# Patient Record
Sex: FEMALE | Race: BLACK OR AFRICAN AMERICAN | NOT HISPANIC OR LATINO | URBAN - METROPOLITAN AREA
[De-identification: names, ages, dates, MRNs, and addresses within clinical notes are randomized per-mention and may not be internally consistent; named-entity substitution may affect disease eponyms.]

---

## 2022-04-30 ENCOUNTER — TELEPHONE ENCOUNTER (OUTPATIENT)
Dept: URBAN - METROPOLITAN AREA CLINIC 121 | Facility: CLINIC | Age: 62
End: 2022-04-30

## 2022-04-30 RX ORDER — LIRAGLUTIDE 6 MG/ML
INJECTION SUBCUTANEOUS
OUTPATIENT
Start: 2013-01-09 | End: 2013-01-16

## 2022-04-30 RX ORDER — LIRAGLUTIDE 6 MG/ML
INJECTION SUBCUTANEOUS
OUTPATIENT
Start: 2013-01-09

## 2022-04-30 RX ORDER — ASPIRIN 81 MG/1
TABLET ORAL
OUTPATIENT
Start: 2013-01-09

## 2022-04-30 RX ORDER — LAMOTRIGINE 25 MG/1
TABLET ORAL
OUTPATIENT
Start: 2013-01-09

## 2022-05-01 ENCOUNTER — TELEPHONE ENCOUNTER (OUTPATIENT)
Dept: URBAN - METROPOLITAN AREA CLINIC 121 | Facility: CLINIC | Age: 62
End: 2022-05-01

## 2022-05-01 RX ORDER — ATORVASTATIN CALCIUM 40 MG/1
QD TABLET, FILM COATED ORAL
Status: ACTIVE | COMMUNITY
Start: 2013-01-16

## 2022-05-01 RX ORDER — ASPIRIN 81 MG/1
QD TABLET ORAL
Status: ACTIVE | COMMUNITY
Start: 2013-01-16

## 2022-05-01 RX ORDER — POLYETHYLENE GLYCOL 3350, SODIUM SULFATE, SODIUM CHLORIDE, POTASSIUM CHLORIDE, ASCORBIC ACID, SODIUM ASCORBATE 7.5-2.691G
MIX AND USE AS DIRECTED KIT ORAL
Status: ACTIVE | COMMUNITY
Start: 2013-01-09

## 2022-05-01 RX ORDER — INSULIN GLARGINE 100 [IU]/ML
25-30U QHS INJECTION, SOLUTION SUBCUTANEOUS
Status: ACTIVE | COMMUNITY
Start: 2013-01-16

## 2022-05-01 RX ORDER — METFORMIN HCL 1000 MG/1
BID TABLET ORAL
Status: ACTIVE | COMMUNITY
Start: 2013-01-16

## 2022-05-01 RX ORDER — METOPROLOL TARTRATE 50 MG/1
BID TABLET ORAL
Status: ACTIVE | COMMUNITY
Start: 2013-01-16

## 2022-06-26 ENCOUNTER — HOSPITAL ENCOUNTER (EMERGENCY)
Facility: HOSPITAL | Age: 62
Discharge: HOME | End: 2022-06-26
Attending: EMERGENCY MEDICINE
Payer: COMMERCIAL

## 2022-06-26 ENCOUNTER — APPOINTMENT (EMERGENCY)
Dept: RADIOLOGY | Facility: HOSPITAL | Age: 62
End: 2022-06-26
Payer: COMMERCIAL

## 2022-06-26 VITALS
SYSTOLIC BLOOD PRESSURE: 140 MMHG | BODY MASS INDEX: 39.71 KG/M2 | HEIGHT: 68 IN | HEART RATE: 86 BPM | OXYGEN SATURATION: 100 % | RESPIRATION RATE: 18 BRPM | WEIGHT: 262 LBS | TEMPERATURE: 98.9 F | DIASTOLIC BLOOD PRESSURE: 76 MMHG

## 2022-06-26 DIAGNOSIS — M25.462 KNEE EFFUSION, LEFT: Primary | ICD-10-CM

## 2022-06-26 DIAGNOSIS — M25.562 ACUTE PAIN OF LEFT KNEE: ICD-10-CM

## 2022-06-26 LAB
APPEARANCE SNV: ABNORMAL
BODY FLD TYPE: ABNORMAL
COLOR SNV: ABNORMAL
LYMPHOCYTES NFR FLD MANUAL: 1 %
MONOS+MACROS NFR FLD MANUAL: 15 %
NEUTROPHILS NFR FLD MANUAL: 84 %
RBC # SNV: ABNORMAL CELLS/CU MM (ref 0–10000)
SPECIMEN SOURCE: ABNORMAL
WBC # SNV AUTO: ABNORMAL CELLS/CU MM (ref 0–200)

## 2022-06-26 PROCEDURE — 89060 EXAM SYNOVIAL FLUID CRYSTALS: CPT | Performed by: PHYSICIAN ASSISTANT

## 2022-06-26 PROCEDURE — 63700000 HC SELF-ADMINISTRABLE DRUG: Performed by: PHYSICIAN ASSISTANT

## 2022-06-26 PROCEDURE — 20610 DRAIN/INJ JOINT/BURSA W/O US: CPT | Performed by: PHYSICIAN ASSISTANT

## 2022-06-26 PROCEDURE — 73564 X-RAY EXAM KNEE 4 OR MORE: CPT | Mod: LT

## 2022-06-26 PROCEDURE — 99283 EMERGENCY DEPT VISIT LOW MDM: CPT | Mod: 25

## 2022-06-26 PROCEDURE — 87070 CULTURE OTHR SPECIMN AEROBIC: CPT | Performed by: PHYSICIAN ASSISTANT

## 2022-06-26 PROCEDURE — 89051 BODY FLUID CELL COUNT: CPT | Performed by: PHYSICIAN ASSISTANT

## 2022-06-26 PROCEDURE — 87205 SMEAR GRAM STAIN: CPT | Performed by: PHYSICIAN ASSISTANT

## 2022-06-26 PROCEDURE — 0S9D3ZZ DRAINAGE OF LEFT KNEE JOINT, PERCUTANEOUS APPROACH: ICD-10-PCS | Performed by: EMERGENCY MEDICINE

## 2022-06-26 RX ORDER — NAPROXEN 250 MG/1
500 TABLET ORAL ONCE
Status: COMPLETED | OUTPATIENT
Start: 2022-06-26 | End: 2022-06-26

## 2022-06-26 RX ORDER — TRAMADOL HYDROCHLORIDE 50 MG/1
50 TABLET ORAL ONCE
Status: COMPLETED | OUTPATIENT
Start: 2022-06-26 | End: 2022-06-26

## 2022-06-26 RX ORDER — TRAMADOL HYDROCHLORIDE 50 MG/1
50 TABLET ORAL EVERY 6 HOURS PRN
Qty: 8 TABLET | Refills: 0 | Status: SHIPPED | OUTPATIENT
Start: 2022-06-26 | End: 2022-06-29

## 2022-06-26 RX ORDER — ACETAMINOPHEN AND CODEINE PHOSPHATE 300; 30 MG/1; MG/1
1 TABLET ORAL ONCE
Status: DISCONTINUED | OUTPATIENT
Start: 2022-06-26 | End: 2022-06-26

## 2022-06-26 RX ORDER — DICLOFENAC SODIUM 10 MG/G
GEL TOPICAL 2 TIMES DAILY PRN
Qty: 100 G | Refills: 0 | Status: SHIPPED | OUTPATIENT
Start: 2022-06-26 | End: 2022-07-01

## 2022-06-26 RX ADMIN — TRAMADOL HYDROCHLORIDE 50 MG: 50 TABLET, COATED ORAL at 14:57

## 2022-06-26 RX ADMIN — NAPROXEN 500 MG: 250 TABLET ORAL at 18:38

## 2022-06-26 ASSESSMENT — ENCOUNTER SYMPTOMS
CHILLS: 0
SHORTNESS OF BREATH: 0
ARTHRALGIAS: 1
ABDOMINAL PAIN: 0
HEADACHES: 0
NAUSEA: 0
LIGHT-HEADEDNESS: 0
NUMBNESS: 0
FEVER: 0
DIZZINESS: 0
COLOR CHANGE: 0
DIAPHORESIS: 0
WEAKNESS: 0
VOMITING: 0
JOINT SWELLING: 1

## 2022-06-26 NOTE — ED PROVIDER NOTES
Emergency Medicine Note  HPI   HISTORY OF PRESENT ILLNESS     61-year-old female with PMH of arthritis presents emergency department for left knee pain over the last 5 days.  Patient states that the pain has progressively worsened since onset.  Patient states pain constant worse with any movement.  Patient states that she was cleaning her house on Wednesday but denies any injuries.  Patient states she has had similar pain in the past with arthritis.  Patient states that within the last 1 to 2 days she has now had swelling to the knee that is worse than she has had before.  Patient states she also feels like her left knee is warm to touch which she has never had before.  Patient denies any numbness or tingling.  Patient denies any fevers, chills, or diaphoresis.  Patient has been taking Tylenol arthritis with minimal relief of symptoms.      History provided by:  Patient   used: No    Knee Pain  Associated symptoms: no fever          Patient History   PAST HISTORY     Reviewed from Nursing Triage:         No past medical history on file.    No past surgical history on file.    No family history on file.           Review of Systems   REVIEW OF SYSTEMS     Review of Systems   Constitutional: Negative for chills, diaphoresis and fever.   Respiratory: Negative for shortness of breath.    Cardiovascular: Negative for chest pain.   Gastrointestinal: Negative for abdominal pain, nausea and vomiting.   Musculoskeletal: Positive for arthralgias and joint swelling.   Skin: Negative for color change and rash.   Neurological: Negative for dizziness, weakness, light-headedness, numbness and headaches.         VITALS     ED Vitals    Date/Time Temp Pulse Resp BP SpO2 [facility]   06/26/22 1309 37.2 °C (98.9 °F) 94 16 144/74 99 % AW        Pulse Ox %: 99 % (06/26/22 1433)  Pulse Ox Interpretation: Normal (06/26/22 1433)  Heart Rate: 94 (06/26/22 1433)  Rhythm Strip Interpretation: Normal Sinus Rhythm (06/26/22 1433)      Physical Exam   PHYSICAL EXAM     Physical Exam  Vitals and nursing note reviewed.   Constitutional:       General: She is not in acute distress.     Appearance: Normal appearance. She is well-developed. She is not ill-appearing or diaphoretic.   HENT:      Head: Normocephalic and atraumatic.   Eyes:      Extraocular Movements: Extraocular movements intact.      Conjunctiva/sclera: Conjunctivae normal.   Cardiovascular:      Rate and Rhythm: Normal rate.      Pulses: Normal pulses.      Comments: 2+ dorsalis pedis pulses bilaterally.  Pulmonary:      Effort: Pulmonary effort is normal. No respiratory distress.   Musculoskeletal:         General: Swelling and tenderness present. Normal range of motion.      Cervical back: Normal range of motion.      Comments: Patient has decreased range of motion of the left knee due to pain.  No pain with micromotion to the left knee.  Patient does have significant swelling appreciated to left knee.  Patient tender palpation to the superior and lateral aspects of the anterior left knee.  No tenderness palpation of the posterior aspect.  No other tenderness palpation appreciated to left lower extremity.   Skin:     General: Skin is warm and dry.      Capillary Refill: Capillary refill takes less than 2 seconds.      Findings: No erythema.      Comments: Left knee is warm to touch.  No erythema appreciated to left knee.   Neurological:      Mental Status: She is alert.      Comments: Sensation intact to lower extremities bilaterally.   Psychiatric:         Mood and Affect: Mood normal.           PROCEDURES     Arthrocentesis    Date/Time: 6/26/2022 3:46 PM  Performed by: Ramila Bryan PA C  Authorized by: Beto Vasquez MD     Consent:     Consent obtained:  Verbal    Consent given by:  Patient    Risks, benefits, and alternatives were discussed: yes      Risks discussed:  Bleeding, incomplete drainage, infection, nerve damage, pain and poor cosmetic result  Universal  protocol:     Procedure explained and questions answered to patient or proxy's satisfaction: yes      Imaging studies available: yes      Patient identity confirmed:  Verbally with patient  Location:     Location:  Knee    Knee:  L knee  Anesthesia:     Anesthesia method:  Local infiltration    Local anesthetic:  Lidocaine 1% w/o epi  Procedure details:     Needle gauge:  22 G    Ultrasound guidance: no      Approach:  Superior    Aspirate amount:  30    Aspirate characteristics:  Yellow and cloudy    Steroid injected: no      Specimen collected: yes    Post-procedure details:     Dressing:  Adhesive bandage    Procedure completion:  Tolerated well, no immediate complications         DATA     Results     None          Imaging Results          X-RAY KNEE LEFT 4+ VIEWS (Final result)  Result time 06/26/22 14:57:36    Final result                 Impression:    IMPRESSION:  1. Large joint effusion.  2. No acute bony abnormality.  3. Osteoarthritis.             Narrative:    CLINICAL HISTORY: Pain.    COMMENT: 4 views of the left knee are performed. No acute fractures or  dislocations are seen. Large joint effusion is noted. Marginal osteophytes are  identified in all 3 compartments. Chondrocalcinosis is seen involving medial and  lateral compartments.                                No orders to display       Scoring tools                                 ED Course & MDM   MDM / ED COURSE / CLINICAL IMPRESSIONS / DISPO     MDM  Number of Diagnoses or Management Options  Diagnosis management comments: 61-year-old female with PMH of arthritis presents emergency department for left knee pain over the last 5 days.  Differential diagnosis is broad.  Will get x-rays of the left knee then reevaluate.       Amount and/or Complexity of Data Reviewed  Tests in the radiology section of CPT®: reviewed        ED Course as of 06/26/22 1825   Sun Jun 26, 2022   1507 X-ray of the left knee reveals Large joint effusion.  2. No acute bony  abnormality.  3. Osteoarthritis.  Patient's left knee is warm to touch compared to the rest of the lower extremity in the right lower extremity.  We will do arthrocentesis to help with symptomatic relief and to eval for any infectious pathology. [KM]   1532 Arthrocentesis was performed to the left knee and 30 cc of fluid was pulled off of the joint.  It is yellow slightly cloudy.  Will send to the lab for evaluation. [KM]   1702 WBC, Fluid(!): 31,000 [KM]   1820 Gram stain did not grow any organisms.  Symptoms most likely due to arthritis.  Will place Ace wrap.  Will give prescription for tramadol to take as needed for pain.  Patient to follow-up with her orthopedic doctor in the next week for reevaluation. Vital signs are stable, pt discharged home. Pt given strict precautions to return to the ED. Pt to follow-up with PCP in 2 days as well as any other providers as indicated.  [KM]   1825 Patient requesting a walker to help with ambulation.  We will give her 1 here. [KM]      ED Course User Index  [KM] Ramila Bryan PA C         Clinical Impressions as of 06/26/22 1825   Knee effusion, left   Acute pain of left knee              Ramila Bryan PA C  06/26/22 2027

## 2022-06-26 NOTE — DISCHARGE INSTRUCTIONS
Please follow-up with your orthopedic doctor in the next 2 to 3 days for reevaluation.     You can take tramadol as prescribed as needed for pain.  Do not drive when taking.    You can place diclofenac gel on your knee as prescribed as needed for pain.    Please elevate and ice your knee in 20-minute increments as directed as needed to help with symptoms.    You can use Ace wrap as directed as needed to help with symptoms.    Please follow up as directed to obtain any final results and review your plan of care. CALL your primary doctor's office today to schedule a follow up appointment within the next 48 hours.       REVIEW AND DISCUSS ALL OF YOUR MEDICATIONS WITH YOUR PRIMARY CARE TEAM WITHIN THE NEXT 2 DAYS, even ones that you may have been on for a long time.      Radiology review of your studies (XRAYs, CT Scans, Ultrasounds, MRI scans) may still be pending. Preliminary results may be available today but final written reports may not be available until tomorrow. Important findings may be included in the final radiology review.       If instructed please CALL the Emergency Department at [phone] to obtain the final results within the next 24 hours. Review the final results of all of your tests with your primary care doctor within the next 2 days.      Cultures and uncommon labs may take 2 days or more before results are available. Please ask about all pending tests until the final results are known. You may not be notified of important test results unless you ask for these when you call or when you follow up.      **PLEASE RETURN TO THE EMERGENCY DEPARTMENT IF YOU DEVELOP ANY NEW OR WORSENING SYMPTOMS**

## 2022-06-26 NOTE — ED ATTESTATION NOTE
The patient was evaluated and managed by the physician assistant.  I agree with the PA/NP’s history, physical, assessment, and plan of care, with the following exceptions: None       Beto Vasquez MD  06/26/22 7457

## 2022-06-27 LAB — CRYSTALS SNV MICRO: NORMAL

## 2022-06-30 LAB
GRAM STN SPEC: NORMAL
GRAM STN SPEC: NORMAL
MICROORGANISM SPEC CULT: NORMAL